# Patient Record
Sex: OTHER/UNKNOWN | URBAN - METROPOLITAN AREA
[De-identification: names, ages, dates, MRNs, and addresses within clinical notes are randomized per-mention and may not be internally consistent; named-entity substitution may affect disease eponyms.]

---

## 2018-12-19 ENCOUNTER — TELEPHONE (OUTPATIENT)
Dept: OTHER | Facility: CLINIC | Age: 28
End: 2018-12-19

## 2019-01-08 NOTE — TELEPHONE ENCOUNTER
1/8/2019    Call Regarding Onboarding P1 Other    Attempt 3    Message on voicemail    Comments:       Outreach   Cassi Richardson